# Patient Record
Sex: MALE | Race: WHITE | NOT HISPANIC OR LATINO | Employment: FULL TIME | URBAN - METROPOLITAN AREA
[De-identification: names, ages, dates, MRNs, and addresses within clinical notes are randomized per-mention and may not be internally consistent; named-entity substitution may affect disease eponyms.]

---

## 2018-07-15 ENCOUNTER — HOSPITAL ENCOUNTER (EMERGENCY)
Facility: HOSPITAL | Age: 43
Discharge: HOME | End: 2018-07-15
Attending: EMERGENCY MEDICINE
Payer: COMMERCIAL

## 2018-07-15 VITALS
RESPIRATION RATE: 18 BRPM | WEIGHT: 315 LBS | HEART RATE: 75 BPM | BODY MASS INDEX: 38.36 KG/M2 | OXYGEN SATURATION: 96 % | SYSTOLIC BLOOD PRESSURE: 162 MMHG | DIASTOLIC BLOOD PRESSURE: 94 MMHG | TEMPERATURE: 99 F | HEIGHT: 76 IN

## 2018-07-15 DIAGNOSIS — L03.818 CELLULITIS OF OTHER SPECIFIED SITE: Primary | ICD-10-CM

## 2018-07-15 PROCEDURE — 99284 EMERGENCY DEPT VISIT MOD MDM: CPT | Mod: 25

## 2018-07-15 PROCEDURE — 25000003 PHARM REV CODE 250: Performed by: NURSE PRACTITIONER

## 2018-07-15 PROCEDURE — 90471 IMMUNIZATION ADMIN: CPT | Performed by: NURSE PRACTITIONER

## 2018-07-15 PROCEDURE — 63600175 PHARM REV CODE 636 W HCPCS: Performed by: NURSE PRACTITIONER

## 2018-07-15 PROCEDURE — 90715 TDAP VACCINE 7 YRS/> IM: CPT | Performed by: NURSE PRACTITIONER

## 2018-07-15 RX ORDER — IBUPROFEN 600 MG/1
600 TABLET ORAL
Status: COMPLETED | OUTPATIENT
Start: 2018-07-15 | End: 2018-07-15

## 2018-07-15 RX ORDER — SULFAMETHOXAZOLE AND TRIMETHOPRIM 800; 160 MG/1; MG/1
2 TABLET ORAL 2 TIMES DAILY
Qty: 40 TABLET | Refills: 0 | Status: SHIPPED | OUTPATIENT
Start: 2018-07-15 | End: 2018-07-25

## 2018-07-15 RX ADMIN — IBUPROFEN 600 MG: 600 TABLET ORAL at 03:07

## 2018-07-15 RX ADMIN — CLOSTRIDIUM TETANI TOXOID ANTIGEN (FORMALDEHYDE INACTIVATED), CORYNEBACTERIUM DIPHTHERIAE TOXOID ANTIGEN (FORMALDEHYDE INACTIVATED), BORDETELLA PERTUSSIS TOXOID ANTIGEN (GLUTARALDEHYDE INACTIVATED), BORDETELLA PERTUSSIS FILAMENTOUS HEMAGGLUTININ ANTIGEN (FORMALDEHYDE INACTIVATED), BORDETELLA PERTUSSIS PERTACTIN ANTIGEN, AND BORDETELLA PERTUSSIS FIMBRIAE 2/3 ANTIGEN 0.5 ML: 5; 2; 2.5; 5; 3; 5 INJECTION, SUSPENSION INTRAMUSCULAR at 04:07

## 2018-07-15 NOTE — DISCHARGE INSTRUCTIONS
Please take your antibiotics as directed and finished entire course even if you are feeling better.  You will need to elevate your extremity as much as possible.  you may take Tylenol or Motrin as needed for pain control.  Please monitor your leg for any increase in swelling, pain, redness, fever, nausea or vomiting and return to the emergency department immediately if any of the signs should present.    You will need to follow up with her primary care physician as soon as possible preferably within 1-2 days.  You will not be able to work while taking this medication as you need to elevate and get as much rest off your leg as possible.    Please return to the Emergency Department for any new or worsening symptoms including: fever, chest pain, shortness of breath, loss of consciousness, dizziness, weakness, or any other concerns.     Please follow up with your Primary Care Provider within in the week. If you do not have one, you may contact the one listed on your discharge paperwork or you may also call the [facility] Appointment Desk at [phone] to schedule an appointment with one.     Please take all medication as prescribed.

## 2018-07-15 NOTE — ED PROVIDER NOTES
"Encounter Date: 7/15/2018    This is a SORT/MSE of a 42 y.o. male presenting to the ED with c/o right leg pain/swelling. Sent from urgent care to r/o DVT. Had a fall 4 days ago. Care will be transferred to an alternate provider when patient is roomed for a full evaluation and final disposition. CELESTE Becerra, FNP-C 7/15/2018 2:52 PM    SCRIBE #1 NOTE: I, Andrea Bullock, am scribing for, and in the presence of,  Elizabeth Chaidez NP. I have scribed the following portions of the note - Other sections scribed: HPI and ROS.       History     Chief Complaint   Patient presents with    Leg Pain     " I fell on Wednesday from high up and urgent care recommend I come in for further evaluation of a possible blood clot in my right lower leg."      CC: Leg Pain     HPI: This 42 y.o M with no pertinent PMHx presents to the ED c/o acute onset of constant, worsening and severe (9/10) right lower leg pain secondary to a 3 foot slip and fall x4 days ago. He began noticing right lower leg redness and swelling x3 days ago. He states he fell face first onto fiber glass also causing a right knee laceration, which has since healed. He describes his pain stating, "it feels like my leg is about to split open." Pain is worse when attempting to bear weight. The pt was evaluated at an urgent care and told to come to the ED for further evaluation out of concern for DVT.l An X-ray was performed, which did not show any evidence of fracture. He denies LOC, fever, chills, diaphoresis, knee pain, chest pain, SOB, abdominal pain, nausea, emesis, diarrhea, dysuria and numbness. No prior tx. He cannot recall his most recent tetanus.          Review of patient's allergies indicates:  Allergies not on file  History reviewed. No pertinent past medical history.  Past Surgical History:   Procedure Laterality Date    BACK SURGERY      SPINAL CORD DECOMPRESSION      pt reported     History reviewed. No pertinent family history.  Social History "   Substance Use Topics    Smoking status: Never Smoker    Smokeless tobacco: Never Used    Alcohol use No     Review of Systems   Constitutional: Negative for chills and fever.   HENT: Negative for rhinorrhea and sore throat.    Eyes: Negative for redness.   Respiratory: Negative for cough.    Cardiovascular: Negative for chest pain.   Gastrointestinal: Negative for abdominal pain, diarrhea, nausea and vomiting.   Genitourinary: Negative for dysuria.   Musculoskeletal: Negative for back pain.        (+) R lower leg pain  (+) R lower leg swelling   Skin: Negative for color change.        (+) R lower leg redness   Neurological: Negative for syncope and weakness.   Psychiatric/Behavioral: The patient is not nervous/anxious.        Physical Exam     Initial Vitals [07/15/18 1452]   BP Pulse Resp Temp SpO2   (!) 174/88 80 18 98.4 °F (36.9 °C) 98 %      MAP       --         Physical Exam    Nursing note and vitals reviewed.  Constitutional: Vital signs are normal. He appears well-developed and well-nourished. He is not diaphoretic. He is cooperative.  Non-toxic appearance. He does not have a sickly appearance. He does not appear ill. No distress.   HENT:   Head: Normocephalic and atraumatic.   Right Ear: External ear normal.   Left Ear: External ear normal.   Nose: Nose normal.   Mouth/Throat: Oropharynx is clear and moist. No oropharyngeal exudate.   Eyes: Conjunctivae and EOM are normal. Pupils are equal, round, and reactive to light.   Neck: Normal range of motion and full passive range of motion without pain. Neck supple.   Cardiovascular: Normal rate, regular rhythm, normal heart sounds, intact distal pulses and normal pulses. Exam reveals no decreased pulses.    Pulmonary/Chest: Effort normal and breath sounds normal. No respiratory distress. He has no decreased breath sounds.   Abdominal: Soft. Normal appearance and bowel sounds are normal. There is no tenderness. There is no rigidity, no rebound, no guarding  and no CVA tenderness.   Musculoskeletal: Normal range of motion.        Right knee: He exhibits swelling and erythema. He exhibits normal range of motion, no effusion, no ecchymosis, no deformity, no laceration, normal alignment, no LCL laxity, normal patellar mobility, no bony tenderness, normal meniscus and no MCL laxity.        Right lower leg: He exhibits tenderness, swelling and edema. He exhibits no bony tenderness, no deformity and no laceration.        Legs:  The patient has full range of motion to right knee, right ankle.  Patient has full strength, and sensation.  Patient is neurovascularly and neurologically intact. There is a diffuse amount of swelling with +2 pitting edema from the knee to the ankle.  There is a small amount of ecchymosis noted to the left lateral malleolus, no tenderness to palpation, patient has pain with palpation of the calf and knee.  There is an abrasion noted to the knee which appears to be healing however is surrounded by erythema and a small amount of edema. There is a 4 cm increase in calf size compared to the left calf.  Cap refills less than 2 sec.   Lymphadenopathy:        Head (right side): No tonsillar adenopathy present.        Head (left side): No tonsillar adenopathy present.     He has no cervical adenopathy.   Neurological: He is alert and oriented to person, place, and time. He has normal strength. No cranial nerve deficit or sensory deficit. Coordination and gait normal. GCS eye subscore is 4. GCS verbal subscore is 5. GCS motor subscore is 6.   Skin: Skin is warm, dry and intact. Capillary refill takes less than 2 seconds. No rash noted.   Psychiatric: He has a normal mood and affect. His behavior is normal. Judgment and thought content normal.         ED Course   Procedures  Labs Reviewed - No data to display       Imaging Results          US Lower Extremity Veins Right (Final result)  Result time 07/15/18 15:57:37    Final result by Shashank Michel MD (07/15/18  15:57:37)                 Impression:      No evidence of deep venous thrombosis in the right lower extremity.      Electronically signed by: Shashank Michel MD  Date:    07/15/2018  Time:    15:57             Narrative:    EXAMINATION:  US LOWER EXTREMITY VEINS RIGHT    CLINICAL HISTORY:  Leg Swelling/Pain;    TECHNIQUE:  Duplex and color flow Doppler evaluation and graded compression of the right lower extremity veins was performed.    COMPARISON:  None    FINDINGS:  Right thigh veins: The common femoral, femoral, popliteal, upper greater saphenous, and deep femoral veins are patent and free of thrombus. The veins are normally compressible and have normal phasic flow and augmentation response.    Right calf veins: The visualized calf veins are patent.    Miscellaneous: None                              X-Rays:   Independently Interpreted Readings:   Other Readings:  Ultrasound the right lower extremity shows no evidence for DVT.          APC / Resident Notes:   Atilio Harrison is a 42 y.o. male who presents to the Emergency Department for evaluation of right leg pain and swelling since falling on a tug boat 5 days ago.  Patient was sent here via urgent care to rule out DVT.    Physical Exam shows a non-toxic, afebrile, and well appearing male. Pertinent exam findings include The patient has full range of motion to right knee, right ankle.  Patient has full strength, and sensation.  Patient is neurovascularly and neurologically intact. There is a diffuse amount of swelling with +2 pitting edema from the knee to the ankle.  There is redness and induration noted to the patella and the distal lower leg. There is a small amount of ecchymosis noted to the left lateral malleolus, no tenderness to palpation, patient has pain with palpation of the calf and knee.  There is an abrasion noted to the knee which appears to be healing however is surrounded by erythema and a small amount of edema. There is a 4 cm increase in  calf size compared to the left calf.  Cap refills less than 2 sec.  A not suspect sepsis as patient does not have any systemic symptoms remains afebrile.  Patient can ambulate a however this does elicit pain. Remaining exam otherwise unremarkable.    Vital Signs Are Reassuring. If available, previous records reviewed.     RESULTS:  Ultrasound the right lower extremity shows no evidence for DVT.    My overall impression is cellulitis. I considered but do not suspect at this time sepsis, DVT, fracture, dislocation, abscess.  X-rays done at urgent care rule out fracture dislocation.    The patient was deemed safe and stable for discharge.    ED Course:  Tetanus, ibuprofen. D/C Meds:  Patient was given Bactrim at discharge instructed to fill antibiotics immediately which he verbalizes understanding. Additional D/C Information:  Ibuprofen, elevation, do not return to work until cleared by primary care physician.  Take antibiotics as directed and finished entire course.  The follow up with PCP within 1 day.  Treatment and plan was discussed with patient in great detail as well as ultrasound results.  Patient verbalizes understanding..  ED return precautions given.  The diagnosis, treatment plan, instructions for follow-up and reevaluation with PCP as well as ED return precautions were discussed and understanding was verbalized. All questions or concerns have been addressed. Patient was discharged home with an instructional sheet which gave not only information regarding the most likely diagnoses but also information regarding when to return to the emergency department for alarming symptoms and when to seek further care.      This case was discussed with and was evaluated by Dr. Velarde who is in agreement with my assessment and plan.          Scribe Attestation:   Scribe #1: I performed the above scribed service and the documentation accurately describes the services I performed. I attest to the accuracy of the  note.    Attending Attestation:           Physician Attestation for Scribe:  Physician Attestation Statement for Scribe #1: I, Elizabeth Chaidez NP, reviewed documentation, as scribed by Andrea Bullock in my presence, and it is both accurate and complete.                    Clinical Impression:   The encounter diagnosis was Cellulitis of other specified site.      Disposition:   Disposition: Discharged  Condition: Stable                        Elizabeth Chaidez NP  07/15/18 [id number]

## 2018-07-15 NOTE — ED TRIAGE NOTES
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 Laceration to the hands and right knee. Reports that his right leg feels as if his leg is about to split open. Pt was seen at the urgent care and instructed to come to the ED.